# Patient Record
Sex: FEMALE | URBAN - METROPOLITAN AREA
[De-identification: names, ages, dates, MRNs, and addresses within clinical notes are randomized per-mention and may not be internally consistent; named-entity substitution may affect disease eponyms.]

---

## 2020-05-30 ENCOUNTER — NURSE TRIAGE (OUTPATIENT)
Dept: NURSING | Facility: CLINIC | Age: 50
End: 2020-05-30
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
## 2020-08-13 ENCOUNTER — APPOINTMENT (OUTPATIENT)
Dept: URBAN - METROPOLITAN AREA CLINIC 252 | Age: 50
Setting detail: DERMATOLOGY
End: 2020-08-13

## 2020-08-13 VITALS — WEIGHT: 138 LBS | HEIGHT: 62.5 IN | RESPIRATION RATE: 14 BRPM

## 2020-08-13 DIAGNOSIS — L71.0 PERIORAL DERMATITIS: ICD-10-CM

## 2020-08-13 DIAGNOSIS — L30.9 DERMATITIS, UNSPECIFIED: ICD-10-CM

## 2020-08-13 DIAGNOSIS — D22 MELANOCYTIC NEVI: ICD-10-CM

## 2020-08-13 PROBLEM — D22.71 MELANOCYTIC NEVI OF RIGHT LOWER LIMB, INCLUDING HIP: Status: ACTIVE | Noted: 2020-08-13

## 2020-08-13 PROBLEM — D22.5 MELANOCYTIC NEVI OF TRUNK: Status: ACTIVE | Noted: 2020-08-13

## 2020-08-13 PROBLEM — D22.72 MELANOCYTIC NEVI OF LEFT LOWER LIMB, INCLUDING HIP: Status: ACTIVE | Noted: 2020-08-13

## 2020-08-13 PROCEDURE — OTHER PRESCRIPTION: OTHER

## 2020-08-13 PROCEDURE — OTHER COUNSELING: OTHER

## 2020-08-13 PROCEDURE — 99203 OFFICE O/P NEW LOW 30 MIN: CPT

## 2020-08-13 RX ORDER — TRIAMCINOLONE ACETONIDE 1 MG/G
0.1% CREAM TOPICAL BID
Qty: 1 | Refills: 0 | Status: ERX | COMMUNITY
Start: 2020-08-13

## 2020-08-13 RX ORDER — MINOCYCLINE HYDROCHLORIDE 50 MG/1
50MG CAPSULE ORAL TWICE PER DAY
Qty: 60 | Refills: 1 | Status: ERX | COMMUNITY
Start: 2020-08-13

## 2020-08-13 ASSESSMENT — LOCATION DETAILED DESCRIPTION DERM
LOCATION DETAILED: MIDDLE STERNUM
LOCATION DETAILED: LEFT SUPERIOR CENTRAL MALAR CHEEK
LOCATION DETAILED: LEFT ANTERIOR DISTAL THIGH
LOCATION DETAILED: LEFT PROXIMAL POSTERIOR UPPER ARM
LOCATION DETAILED: RIGHT ANTERIOR DISTAL THIGH

## 2020-08-13 ASSESSMENT — LOCATION SIMPLE DESCRIPTION DERM
LOCATION SIMPLE: RIGHT THIGH
LOCATION SIMPLE: LEFT THIGH
LOCATION SIMPLE: CHEST
LOCATION SIMPLE: LEFT CHEEK
LOCATION SIMPLE: LEFT POSTERIOR UPPER ARM

## 2020-08-13 ASSESSMENT — LOCATION ZONE DERM
LOCATION ZONE: TRUNK
LOCATION ZONE: ARM
LOCATION ZONE: LEG
LOCATION ZONE: FACE

## 2020-08-13 NOTE — PROCEDURE: COUNSELING
Detail Level: Detailed
Patient Specific Counseling (Will Not Stick From Patient To Patient): - Recommended oral minocycline twice per day until clinically resolved then for 5 additional days.
Detail Level: Simple
Patient Specific Counseling (Will Not Stick From Patient To Patient): Discussed atopic dermatitis vs contact dermatitis. Recommended using bid as needed. Moisturize twice per day for maintenance with fragrance free moisturizer.
Detail Level: Zone

## 2020-08-13 NOTE — HPI: RASH
Is This A New Presentation, Or A Follow-Up?: Rash
Additional History: Hydrocortisone valerate helped after 2 application.
How Severe Is Your Rash?: mild
Additional History: She reports that she has seen several doctors who cannot figure this out. She reports that she has not been given an antibiotic for this problem.

## 2020-10-12 ENCOUNTER — RX ONLY (RX ONLY)
Age: 50
End: 2020-10-12

## 2020-10-12 RX ORDER — TRIAMCINOLONE ACETONIDE 1 MG/G
0.1% CREAM TOPICAL BID
Qty: 1 | Refills: 2 | Status: ERX

## 2021-09-16 ENCOUNTER — APPOINTMENT (OUTPATIENT)
Dept: URBAN - METROPOLITAN AREA CLINIC 252 | Age: 51
Setting detail: DERMATOLOGY
End: 2021-09-17

## 2021-09-16 VITALS — RESPIRATION RATE: 15 BRPM | WEIGHT: 135 LBS | HEIGHT: 63 IN

## 2021-09-16 DIAGNOSIS — L56.4 POLYMORPHOUS LIGHT ERUPTION: ICD-10-CM

## 2021-09-16 PROCEDURE — 99213 OFFICE O/P EST LOW 20 MIN: CPT | Mod: 25

## 2021-09-16 PROCEDURE — OTHER COUNSELING: OTHER

## 2021-09-16 PROCEDURE — OTHER INTRAMUSCULAR KENALOG: OTHER

## 2021-09-16 ASSESSMENT — LOCATION ZONE DERM
LOCATION ZONE: ARM
LOCATION ZONE: TRUNK

## 2021-09-16 ASSESSMENT — LOCATION DETAILED DESCRIPTION DERM
LOCATION DETAILED: RIGHT PROXIMAL POSTERIOR UPPER ARM
LOCATION DETAILED: RIGHT BUTTOCK
LOCATION DETAILED: LEFT PROXIMAL POSTERIOR UPPER ARM

## 2021-09-16 ASSESSMENT — LOCATION SIMPLE DESCRIPTION DERM
LOCATION SIMPLE: RIGHT BUTTOCK
LOCATION SIMPLE: RIGHT POSTERIOR UPPER ARM
LOCATION SIMPLE: LEFT POSTERIOR UPPER ARM

## 2021-09-16 NOTE — HPI: RASH
Is This A New Presentation, Or A Follow-Up?: Rash
Additional History: Was given triamcinolone and betamethasone and hydrocortisone valerate. This flare started 6-8 weeks ago. She she goes in sun she gets a bubbly rash after 15-20 minutes every spring break. She has some peely skin from maybe a sunburn. She reports being severely itchy. She minimally uses sunscreen. Used betamethasone bid for a few days and it did not help. She reports 8/10. Denies side effects with oral sterlids in the past.

## 2021-09-16 NOTE — PROCEDURE: COUNSELING
Patient Specific Counseling (Will Not Stick From Patient To Patient): - Discussed and recommended intramuscular injection of Kenalog.\\nDiscussed heliocare and seeking shade. Recommended benadryl at night and cetirizine qam until itch resolves. Return to clinic in 2 weeks if not resolved.
Detail Level: Simple

## 2022-07-25 ENCOUNTER — APPOINTMENT (OUTPATIENT)
Dept: URBAN - METROPOLITAN AREA CLINIC 252 | Age: 52
Setting detail: DERMATOLOGY
End: 2022-07-25

## 2022-07-25 VITALS — RESPIRATION RATE: 16 BRPM | HEIGHT: 63 IN | WEIGHT: 135 LBS

## 2022-07-25 DIAGNOSIS — D22 MELANOCYTIC NEVI: ICD-10-CM

## 2022-07-25 DIAGNOSIS — L81.4 OTHER MELANIN HYPERPIGMENTATION: ICD-10-CM

## 2022-07-25 DIAGNOSIS — L82.1 OTHER SEBORRHEIC KERATOSIS: ICD-10-CM

## 2022-07-25 DIAGNOSIS — D18.0 HEMANGIOMA: ICD-10-CM

## 2022-07-25 DIAGNOSIS — Z71.89 OTHER SPECIFIED COUNSELING: ICD-10-CM

## 2022-07-25 DIAGNOSIS — D69.2 OTHER NONTHROMBOCYTOPENIC PURPURA: ICD-10-CM

## 2022-07-25 PROBLEM — D18.01 HEMANGIOMA OF SKIN AND SUBCUTANEOUS TISSUE: Status: ACTIVE | Noted: 2022-07-25

## 2022-07-25 PROBLEM — D22.71 MELANOCYTIC NEVI OF RIGHT LOWER LIMB, INCLUDING HIP: Status: ACTIVE | Noted: 2022-07-25

## 2022-07-25 PROCEDURE — OTHER COUNSELING: OTHER

## 2022-07-25 PROCEDURE — OTHER PHOTO-DOCUMENTATION: OTHER

## 2022-07-25 PROCEDURE — OTHER ADDITIONAL NOTES: OTHER

## 2022-07-25 PROCEDURE — 99213 OFFICE O/P EST LOW 20 MIN: CPT

## 2022-07-25 ASSESSMENT — LOCATION SIMPLE DESCRIPTION DERM
LOCATION SIMPLE: LEFT PRETIBIAL REGION
LOCATION SIMPLE: UPPER BACK
LOCATION SIMPLE: RIGHT PRETIBIAL REGION
LOCATION SIMPLE: LEFT POSTERIOR UPPER ARM
LOCATION SIMPLE: RIGHT PLANTAR SURFACE
LOCATION SIMPLE: RIGHT POSTERIOR UPPER ARM
LOCATION SIMPLE: ABDOMEN

## 2022-07-25 ASSESSMENT — LOCATION DETAILED DESCRIPTION DERM
LOCATION DETAILED: RIGHT PROXIMAL LATERAL POSTERIOR UPPER ARM
LOCATION DETAILED: LEFT PROXIMAL PRETIBIAL REGION
LOCATION DETAILED: RIGHT PROXIMAL PRETIBIAL REGION
LOCATION DETAILED: SUPERIOR THORACIC SPINE
LOCATION DETAILED: PERIUMBILICAL SKIN
LOCATION DETAILED: LEFT PROXIMAL POSTERIOR UPPER ARM
LOCATION DETAILED: RIGHT INSTEP

## 2022-07-25 ASSESSMENT — LOCATION ZONE DERM
LOCATION ZONE: ARM
LOCATION ZONE: FEET
LOCATION ZONE: LEG
LOCATION ZONE: TRUNK

## 2022-07-25 NOTE — PROCEDURE: ADDITIONAL NOTES
Detail Level: Simple
Additional Notes: **** recommend appointment with opthomology because on plaquinol
Render Risk Assessment In Note?: no

## 2023-02-20 NOTE — PROCEDURE: COUNSELING
· Stable prior to this episode, diarrhea likely due to diverticulitis
Detail Level: Zone
Detail Level: Detailed
Detail Level: Generalized

## 2023-05-18 ENCOUNTER — APPOINTMENT (OUTPATIENT)
Dept: URBAN - METROPOLITAN AREA CLINIC 252 | Age: 53
Setting detail: DERMATOLOGY
End: 2023-05-22

## 2023-05-18 VITALS — HEIGHT: 62 IN | WEIGHT: 143 LBS | RESPIRATION RATE: 16 BRPM

## 2023-05-18 DIAGNOSIS — R21 RASH AND OTHER NONSPECIFIC SKIN ERUPTION: ICD-10-CM

## 2023-05-18 PROCEDURE — OTHER COUNSELING: OTHER

## 2023-05-18 PROCEDURE — OTHER PRESCRIPTION: OTHER

## 2023-05-18 PROCEDURE — OTHER PRESCRIPTION MEDICATION MANAGEMENT: OTHER

## 2023-05-18 PROCEDURE — 99213 OFFICE O/P EST LOW 20 MIN: CPT

## 2023-05-18 RX ORDER — CETIRIZINE HCL 1 MG/ML
SOLUTION, ORAL ORAL
Qty: 60 | Refills: 1 | Status: ERX | COMMUNITY
Start: 2023-05-18

## 2023-05-18 ASSESSMENT — LOCATION ZONE DERM: LOCATION ZONE: TRUNK

## 2023-05-18 ASSESSMENT — LOCATION DETAILED DESCRIPTION DERM: LOCATION DETAILED: LEFT MEDIAL SUPERIOR CHEST

## 2023-05-18 ASSESSMENT — LOCATION SIMPLE DESCRIPTION DERM: LOCATION SIMPLE: CHEST

## 2023-05-18 NOTE — HPI: RASH
What Type Of Note Output Would You Prefer (Optional)?: Bullet Format
How Severe Is Your Rash?: moderate
Is This A New Presentation, Or A Follow-Up?: Rash
Additional History: Hx of chronic covid \\n

## 2023-05-18 NOTE — PROCEDURE: PRESCRIPTION MEDICATION MANAGEMENT
Render In Strict Bullet Format?: No
Continue Regimen: Doxycycline 100 mg and Triamcinolone as prescribed by ER doctors.
Detail Level: Zone

## 2023-05-18 NOTE — PROCEDURE: COUNSELING
Detail Level: Detailed
Patient Specific Counseling (Will Not Stick From Patient To Patient): **discussed taking Zyrtec.\\n**discussed with pt if rash does not resolve she can be worked in for a bx.

## 2023-08-22 ENCOUNTER — RX ONLY (RX ONLY)
Age: 53
End: 2023-08-22

## 2023-08-22 RX ORDER — CETIRIZINE HCL 1 MG/ML
SOLUTION, ORAL ORAL
Qty: 60 | Refills: 1 | Status: ERX

## 2023-08-22 RX ORDER — CETIRIZINE HCL 1 MG/ML
SOLUTION, ORAL ORAL
Qty: 90 | Refills: 0 | Status: ERX | COMMUNITY
Start: 2023-08-22

## 2023-11-16 ENCOUNTER — RX ONLY (RX ONLY)
Age: 53
End: 2023-11-16

## 2023-11-16 RX ORDER — CETIRIZINE HCL 1 MG/ML
SOLUTION, ORAL ORAL
Qty: 60 | Refills: 0 | Status: ERX

## 2025-04-14 ENCOUNTER — APPOINTMENT (OUTPATIENT)
Dept: URBAN - METROPOLITAN AREA CLINIC 252 | Age: 55
Setting detail: DERMATOLOGY
End: 2025-04-14

## 2025-04-14 VITALS — RESPIRATION RATE: 16 BRPM | HEIGHT: 63 IN | WEIGHT: 143 LBS

## 2025-04-14 DIAGNOSIS — D49.2 NEOPLASM OF UNSPECIFIED BEHAVIOR OF BONE, SOFT TISSUE, AND SKIN: ICD-10-CM

## 2025-04-14 DIAGNOSIS — D22 MELANOCYTIC NEVI: ICD-10-CM

## 2025-04-14 DIAGNOSIS — L57.8 OTHER SKIN CHANGES DUE TO CHRONIC EXPOSURE TO NONIONIZING RADIATION: ICD-10-CM

## 2025-04-14 DIAGNOSIS — L72.0 EPIDERMAL CYST: ICD-10-CM

## 2025-04-14 DIAGNOSIS — L81.4 OTHER MELANIN HYPERPIGMENTATION: ICD-10-CM

## 2025-04-14 DIAGNOSIS — Z71.89 OTHER SPECIFIED COUNSELING: ICD-10-CM

## 2025-04-14 DIAGNOSIS — L85.3 XEROSIS CUTIS: ICD-10-CM

## 2025-04-14 PROBLEM — D22.71 MELANOCYTIC NEVI OF RIGHT LOWER LIMB, INCLUDING HIP: Status: ACTIVE | Noted: 2025-04-14

## 2025-04-14 PROCEDURE — 99213 OFFICE O/P EST LOW 20 MIN: CPT | Mod: 25

## 2025-04-14 PROCEDURE — OTHER BIOPSY BY SHAVE METHOD: OTHER

## 2025-04-14 PROCEDURE — 11102 TANGNTL BX SKIN SINGLE LES: CPT

## 2025-04-14 PROCEDURE — OTHER COUNSELING: OTHER

## 2025-04-14 ASSESSMENT — LOCATION SIMPLE DESCRIPTION DERM
LOCATION SIMPLE: RIGHT NOSE
LOCATION SIMPLE: RIGHT PLANTAR SURFACE
LOCATION SIMPLE: RIGHT FOREHEAD
LOCATION SIMPLE: RIGHT CHEEK
LOCATION SIMPLE: LEFT SHOULDER
LOCATION SIMPLE: RIGHT INDEX FINGER
LOCATION SIMPLE: LEFT INDEX FINGER
LOCATION SIMPLE: NOSE
LOCATION SIMPLE: RIGHT UPPER BACK

## 2025-04-14 ASSESSMENT — LOCATION DETAILED DESCRIPTION DERM
LOCATION DETAILED: RIGHT SUPERIOR UPPER BACK
LOCATION DETAILED: RIGHT PLANTAR FOREFOOT OVERLYING 3RD METATARSAL
LOCATION DETAILED: RIGHT INFERIOR LATERAL FOREHEAD
LOCATION DETAILED: LEFT POSTERIOR SHOULDER
LOCATION DETAILED: NASAL TIP
LOCATION DETAILED: RIGHT CENTRAL MALAR CHEEK
LOCATION DETAILED: RIGHT NASAL ALA
LOCATION DETAILED: RIGHT DISTAL DORSAL INDEX FINGER
LOCATION DETAILED: LEFT MID DORSAL INDEX FINGER

## 2025-04-14 ASSESSMENT — LOCATION ZONE DERM
LOCATION ZONE: TRUNK
LOCATION ZONE: ARM
LOCATION ZONE: NOSE
LOCATION ZONE: FACE
LOCATION ZONE: FEET
LOCATION ZONE: FINGER
LOCATION ZONE: NOSE

## 2025-04-14 NOTE — PROCEDURE: COUNSELING
Detail Level: Generalized
Detail Level: Simple
Detail Level: Detailed
Patient Specific Counseling (Will Not Stick From Patient To Patient): **discussed pricing and removal. Pt will schedule a separate appointment.
Patient Specific Counseling (Will Not Stick From Patient To Patient): **recommended 40% urea cream
Detail Level: Zone

## 2025-04-14 NOTE — PROCEDURE: BIOPSY BY SHAVE METHOD
Dressing: bandage
Was A Bandage Applied: Yes
Validate Triangulation: No
Curettage Text: The wound bed was treated with curettage after the biopsy was performed.
Size Of Lesion In Cm: 0
Anesthesia Volume In Cc: 0.3
Consent: - Consent was obtained and risks were reviewed prior to procedure today. All questions were answered prior to procedure today.\\n- Risks discussed include but are not limited to scarring, infection, bleeding, scabbing, incomplete removal, nerve damage, pain, and allergy to anesthesia.
Cryotherapy Text: The wound bed was treated with cryotherapy after the biopsy was performed.
Biopsy Type: H and E
Lab: -[id number]
Detail Level: Detailed
Information: Selecting Yes will display possible errors in your note based on the variables you have selected. This validation is only offered as a suggestion for you. PLEASE NOTE THAT THE VALIDATION TEXT WILL BE REMOVED WHEN YOU FINALIZE YOUR NOTE. IF YOU WANT TO FAX A PRELIMINARY NOTE YOU WILL NEED TO TOGGLE THIS TO 'NO' IF YOU DO NOT WANT IT IN YOUR FAXED NOTE.
Electrodesiccation Text: The wound bed was treated with electrodesiccation after the biopsy was performed.
Hemostasis: Drysol
Billing Type: Third-Party Bill
Biopsy Method: Dermablade
Anesthesia Type: 1% lidocaine with epinephrine
Electrodesiccation And Curettage Text: The wound bed was treated with electrodesiccation and curettage after the biopsy was performed.
Type Of Destruction Used: Curettage
Depth Of Biopsy: dermis
Wound Care: Aquaphor
Post-Care Instructions: WOUND CARE:\\nDo NOT submerge wound in a bath, swimming pool, or hot tub for at least 1 week or for as long as there is an open wound. Gently cleanse the site daily with mild soap and water. Be careful NOT to allow a forceful stream of water to hit the biopsy site. After cleaning/showering, apply a thin layer of petrolatum ointment or Aquaphor in the wound followed by an adhesive bandage. Continue this process daily until healed. \\n\\nBLEEDING:\\nIf you develop persistent bleeding, apply firm and steady pressure over the dressing with gauze for 15 minutes. If bleeding persists, reapply pressure with an ice pack over the gauze for 15 minutes. NEVER APPLY ICE DIRECTLY TO THE SKIN. Do NOT peek under the gauze during these 15 minutes of pressure.  Call our office at [phone] if you cannot get the bleeding to stop. \\n\\nINFECTION:\\nSigns of infection may include increasing rather than decreasing pain (after the first few days), increasing redness/swelling/heat, draining pus, pink/red streaks around the wound, and fever or chills.  Please call our office immediately at [phone] if infection is suspected. It is normal to have some mild redness on or around the wound edges; this will lighten day by day and will become less tender.\\n\\nPAIN:\\nPain is usually minimal, but if needed you may take acetaminophen (Tylenol) every four hours as needed. Applying an ice pack over the dressing for 15-20 minutes every 2-3 hours will relieve swelling, lessen pain, and help minimize bruising. NEVER APPLY ICE DIRECTLY TO THE SKIN. Avoid ibuprofen (Advil, Motrin) and naproxen (Aleve) for the first 48 hours as these can increase bleeding.\\n\\nSWELLING AND BRUISING:\\nSwelling and bruising are common and temporary, usually lasting 1 - 2 weeks. It is more common in areas treated around the eyes, hands, and feet. In the days following the procedure, swelling and bruising can be minimized by keeping the affected areas elevated when possible, reducing salty foods, and applying ice packs over the dressing for 15-20 minutes every 2-3 hours. NEVER APPLY ICE DIRECTLY TO THE SKIN.\\n\\nITCHING:\\nItchiness on and around the wound is very common and can last several days to weeks after surgery. Mild itch is normal as the wound is healing. \\n\\nNERVE CHANGES:\\nNumbness is usually temporary, but it may last for several weeks to months. You may also experience sharp pains at the wound sight as it heals.  Mild pain is normal and will gradually improve with time.\\n \\nNO SMOKING:\\nSmoking will delay the healing process. If you smoke, we recommend trying to quit or at minimum reduce how much you smoke following a procedure.
Silver Nitrate Text: The wound bed was treated with silver nitrate after the biopsy was performed.
Notification Instructions: - It can take up to 2 -3 weeks to get a biopsy result. \\n- Please refrain from calling to request results until after 2 weeks.

## 2025-05-29 ENCOUNTER — HOSPITAL ENCOUNTER (EMERGENCY)
Facility: CLINIC | Age: 55
Discharge: HOME | End: 2025-05-29
Attending: EMERGENCY MEDICINE
Payer: COMMERCIAL

## 2025-05-29 VITALS
SYSTOLIC BLOOD PRESSURE: 146 MMHG | RESPIRATION RATE: 16 BRPM | WEIGHT: 140 LBS | DIASTOLIC BLOOD PRESSURE: 74 MMHG | HEART RATE: 97 BPM | TEMPERATURE: 98.5 F | OXYGEN SATURATION: 100 %

## 2025-05-29 DIAGNOSIS — E16.2 HYPOGLYCEMIA: ICD-10-CM

## 2025-05-29 DIAGNOSIS — R42 LIGHTHEADEDNESS: ICD-10-CM

## 2025-05-29 DIAGNOSIS — R53.1 GENERALIZED WEAKNESS: ICD-10-CM

## 2025-05-29 LAB
ANION GAP SERPL CALCULATED.3IONS-SCNC: 9 MMOL/L (ref 7–15)
ATRIAL RATE - MUSE: 67 BPM
BASOPHILS # BLD AUTO: 0 10E3/UL (ref 0–0.2)
BASOPHILS NFR BLD AUTO: 0 %
BUN SERPL-MCNC: 18.2 MG/DL (ref 6–20)
CALCIUM SERPL-MCNC: 9.8 MG/DL (ref 8.8–10.4)
CHLORIDE SERPL-SCNC: 101 MMOL/L (ref 98–107)
CREAT SERPL-MCNC: 0.92 MG/DL (ref 0.51–0.95)
DIASTOLIC BLOOD PRESSURE - MUSE: NORMAL MMHG
EGFRCR SERPLBLD CKD-EPI 2021: 74 ML/MIN/1.73M2
EOSINOPHIL # BLD AUTO: 0.1 10E3/UL (ref 0–0.7)
EOSINOPHIL NFR BLD AUTO: 1 %
ERYTHROCYTE [DISTWIDTH] IN BLOOD BY AUTOMATED COUNT: 13.2 % (ref 10–15)
GLUCOSE BLDC GLUCOMTR-MCNC: 117 MG/DL (ref 70–99)
GLUCOSE BLDC GLUCOMTR-MCNC: 124 MG/DL (ref 70–99)
GLUCOSE BLDC GLUCOMTR-MCNC: 137 MG/DL (ref 70–99)
GLUCOSE BLDC GLUCOMTR-MCNC: 146 MG/DL (ref 70–99)
GLUCOSE BLDC GLUCOMTR-MCNC: 40 MG/DL (ref 70–99)
GLUCOSE BLDC GLUCOMTR-MCNC: 55 MG/DL (ref 70–99)
GLUCOSE BLDC GLUCOMTR-MCNC: 63 MG/DL (ref 70–99)
GLUCOSE BLDC GLUCOMTR-MCNC: 72 MG/DL (ref 70–99)
GLUCOSE BLDC GLUCOMTR-MCNC: 76 MG/DL (ref 70–99)
GLUCOSE BLDC GLUCOMTR-MCNC: 79 MG/DL (ref 70–99)
GLUCOSE BLDC GLUCOMTR-MCNC: 97 MG/DL (ref 70–99)
GLUCOSE BLDC GLUCOMTR-MCNC: 98 MG/DL (ref 70–99)
GLUCOSE SERPL-MCNC: 74 MG/DL (ref 70–99)
HCO3 SERPL-SCNC: 28 MMOL/L (ref 22–29)
HCT VFR BLD AUTO: 44.3 % (ref 35–47)
HGB BLD-MCNC: 14.3 G/DL (ref 11.7–15.7)
IMM GRANULOCYTES # BLD: 0 10E3/UL
IMM GRANULOCYTES NFR BLD: 0 %
INTERPRETATION ECG - MUSE: NORMAL
LYMPHOCYTES # BLD AUTO: 1.3 10E3/UL (ref 0.8–5.3)
LYMPHOCYTES NFR BLD AUTO: 15 %
MCH RBC QN AUTO: 31.3 PG (ref 26.5–33)
MCHC RBC AUTO-ENTMCNC: 32.3 G/DL (ref 31.5–36.5)
MCV RBC AUTO: 97 FL (ref 78–100)
MONOCYTES # BLD AUTO: 0.5 10E3/UL (ref 0–1.3)
MONOCYTES NFR BLD AUTO: 6 %
NEUTROPHILS # BLD AUTO: 7.2 10E3/UL (ref 1.6–8.3)
NEUTROPHILS NFR BLD AUTO: 78 %
NRBC # BLD AUTO: 0 10E3/UL
NRBC BLD AUTO-RTO: 0 /100
P AXIS - MUSE: 54 DEGREES
PLATELET # BLD AUTO: 265 10E3/UL (ref 150–450)
POTASSIUM SERPL-SCNC: 4.5 MMOL/L (ref 3.4–5.3)
PR INTERVAL - MUSE: 218 MS
QRS DURATION - MUSE: 82 MS
QT - MUSE: 390 MS
QTC - MUSE: 412 MS
R AXIS - MUSE: 18 DEGREES
RBC # BLD AUTO: 4.57 10E6/UL (ref 3.8–5.2)
SODIUM SERPL-SCNC: 138 MMOL/L (ref 135–145)
SYSTOLIC BLOOD PRESSURE - MUSE: NORMAL MMHG
T AXIS - MUSE: 19 DEGREES
TROPONIN T SERPL HS-MCNC: <6 NG/L
VENTRICULAR RATE- MUSE: 67 BPM
WBC # BLD AUTO: 9.2 10E3/UL (ref 4–11)

## 2025-05-29 PROCEDURE — 36415 COLL VENOUS BLD VENIPUNCTURE: CPT | Performed by: EMERGENCY MEDICINE

## 2025-05-29 PROCEDURE — 250N000013 HC RX MED GY IP 250 OP 250 PS 637: Performed by: EMERGENCY MEDICINE

## 2025-05-29 PROCEDURE — 99284 EMERGENCY DEPT VISIT MOD MDM: CPT | Mod: 25

## 2025-05-29 PROCEDURE — 85025 COMPLETE CBC W/AUTO DIFF WBC: CPT | Performed by: EMERGENCY MEDICINE

## 2025-05-29 PROCEDURE — 96360 HYDRATION IV INFUSION INIT: CPT

## 2025-05-29 PROCEDURE — 84484 ASSAY OF TROPONIN QUANT: CPT | Performed by: EMERGENCY MEDICINE

## 2025-05-29 PROCEDURE — 82310 ASSAY OF CALCIUM: CPT | Performed by: EMERGENCY MEDICINE

## 2025-05-29 PROCEDURE — 96361 HYDRATE IV INFUSION ADD-ON: CPT

## 2025-05-29 PROCEDURE — 82962 GLUCOSE BLOOD TEST: CPT

## 2025-05-29 PROCEDURE — 258N000001 HC RX 258: Performed by: EMERGENCY MEDICINE

## 2025-05-29 PROCEDURE — 258N000003 HC RX IP 258 OP 636: Performed by: EMERGENCY MEDICINE

## 2025-05-29 PROCEDURE — 93005 ELECTROCARDIOGRAM TRACING: CPT

## 2025-05-29 RX ORDER — ONDANSETRON 4 MG/1
4 TABLET, ORALLY DISINTEGRATING ORAL EVERY 8 HOURS PRN
COMMUNITY

## 2025-05-29 RX ORDER — OXYCODONE HYDROCHLORIDE 5 MG/1
5 TABLET ORAL EVERY 6 HOURS PRN
COMMUNITY

## 2025-05-29 RX ORDER — DICYCLOMINE HCL 20 MG
20 TABLET ORAL 4 TIMES DAILY PRN
COMMUNITY

## 2025-05-29 RX ORDER — MAGNESIUM OXIDE 400 MG/1
800 TABLET ORAL AT BEDTIME
COMMUNITY

## 2025-05-29 RX ORDER — FLUTICASONE PROPIONATE 50 MCG
1 SPRAY, SUSPENSION (ML) NASAL DAILY PRN
COMMUNITY

## 2025-05-29 RX ORDER — FLECAINIDE ACETATE 100 MG/1
100 TABLET ORAL 2 TIMES DAILY
COMMUNITY

## 2025-05-29 RX ORDER — ALPRAZOLAM 0.5 MG
0.5 TABLET ORAL 3 TIMES DAILY PRN
COMMUNITY

## 2025-05-29 RX ORDER — DEXTROSE MONOHYDRATE AND SODIUM CHLORIDE 5; .45 G/100ML; G/100ML
INJECTION, SOLUTION INTRAVENOUS CONTINUOUS
Status: DISCONTINUED | OUTPATIENT
Start: 2025-05-29 | End: 2025-05-29

## 2025-05-29 RX ORDER — OMEPRAZOLE 20 MG/1
20 CAPSULE, DELAYED RELEASE ORAL DAILY
COMMUNITY
End: 2025-05-29

## 2025-05-29 RX ORDER — CLONAZEPAM 1 MG/1
1 TABLET ORAL AT BEDTIME
COMMUNITY

## 2025-05-29 RX ORDER — ZOLMITRIPTAN 5 MG/1
5 TABLET, ORALLY DISINTEGRATING ORAL
COMMUNITY

## 2025-05-29 RX ORDER — DEXTROSE MONOHYDRATE AND SODIUM CHLORIDE 5; .45 G/100ML; G/100ML
INJECTION, SOLUTION INTRAVENOUS ONCE
Status: COMPLETED | OUTPATIENT
Start: 2025-05-29 | End: 2025-05-29

## 2025-05-29 RX ORDER — POLYETHYLENE GLYCOL 3350 17 G/17G
1-2 POWDER, FOR SOLUTION ORAL DAILY
COMMUNITY

## 2025-05-29 RX ORDER — ESOMEPRAZOLE MAGNESIUM 40 MG/1
40 CAPSULE, DELAYED RELEASE ORAL
COMMUNITY

## 2025-05-29 RX ORDER — LEVOTHYROXINE SODIUM 125 UG/1
125 TABLET ORAL
COMMUNITY

## 2025-05-29 RX ORDER — VITAMIN B COMPLEX
1 TABLET ORAL DAILY
COMMUNITY

## 2025-05-29 RX ORDER — DEXTROSE MONOHYDRATE 100 MG/ML
INJECTION, SOLUTION INTRAVENOUS ONCE
Status: DISCONTINUED | OUTPATIENT
Start: 2025-05-29 | End: 2025-05-29

## 2025-05-29 RX ORDER — CETIRIZINE HYDROCHLORIDE 10 MG/1
10 TABLET ORAL DAILY
COMMUNITY

## 2025-05-29 RX ORDER — ESTRADIOL 0.1 MG/G
CREAM VAGINAL
COMMUNITY

## 2025-05-29 RX ORDER — KETOROLAC TROMETHAMINE 30 MG/ML
30 INJECTION, SOLUTION INTRAMUSCULAR; INTRAVENOUS
COMMUNITY

## 2025-05-29 RX ORDER — HYDROXYZINE HYDROCHLORIDE 25 MG/1
25 TABLET, FILM COATED ORAL 3 TIMES DAILY PRN
COMMUNITY

## 2025-05-29 RX ORDER — SODIUM FLUORIDE 5 MG/G
GEL, DENTIFRICE DENTAL AT BEDTIME
COMMUNITY

## 2025-05-29 RX ORDER — ALBUTEROL SULFATE 90 UG/1
2 INHALANT RESPIRATORY (INHALATION) EVERY 6 HOURS PRN
COMMUNITY

## 2025-05-29 RX ORDER — GALCANEZUMAB 120 MG/ML
120 INJECTION, SOLUTION SUBCUTANEOUS
COMMUNITY

## 2025-05-29 RX ORDER — DEXTROSE MONOHYDRATE 25 G/50ML
25-50 INJECTION, SOLUTION INTRAVENOUS
Status: DISCONTINUED | OUTPATIENT
Start: 2025-05-29 | End: 2025-05-29 | Stop reason: HOSPADM

## 2025-05-29 RX ORDER — ESTRADIOL 0.04 MG/D
1 PATCH, EXTENDED RELEASE TRANSDERMAL
COMMUNITY
End: 2025-05-29

## 2025-05-29 RX ORDER — POLYETHYLENE GLYCOL 3350 17 G/17G
238 POWDER, FOR SOLUTION ORAL
COMMUNITY

## 2025-05-29 RX ORDER — NICOTINE POLACRILEX 4 MG
15-30 LOZENGE BUCCAL
Status: DISCONTINUED | OUTPATIENT
Start: 2025-05-29 | End: 2025-05-29 | Stop reason: HOSPADM

## 2025-05-29 RX ORDER — FAMOTIDINE 20 MG/1
40 TABLET, FILM COATED ORAL AT BEDTIME
COMMUNITY

## 2025-05-29 RX ADMIN — DEXTROSE AND SODIUM CHLORIDE: 5; .45 INJECTION, SOLUTION INTRAVENOUS at 14:02

## 2025-05-29 RX ADMIN — DEXTROSE: 10 SOLUTION INTRAVENOUS at 16:34

## 2025-05-29 RX ADMIN — DEXTROSE 30 G: 15 GEL ORAL at 14:39

## 2025-05-29 RX ADMIN — SODIUM CHLORIDE 1000 ML: 0.9 INJECTION, SOLUTION INTRAVENOUS at 18:03

## 2025-05-29 RX ADMIN — SODIUM CHLORIDE 1000 ML: 0.9 INJECTION, SOLUTION INTRAVENOUS at 14:02

## 2025-05-29 RX ADMIN — DEXTROSE 30 G: 15 GEL ORAL at 15:49

## 2025-05-29 ASSESSMENT — ACTIVITIES OF DAILY LIVING (ADL)
ADLS_ACUITY_SCORE: 41

## 2025-05-29 ASSESSMENT — COLUMBIA-SUICIDE SEVERITY RATING SCALE - C-SSRS
2. HAVE YOU ACTUALLY HAD ANY THOUGHTS OF KILLING YOURSELF IN THE PAST MONTH?: NO
1. IN THE PAST MONTH, HAVE YOU WISHED YOU WERE DEAD OR WISHED YOU COULD GO TO SLEEP AND NOT WAKE UP?: NO
6. HAVE YOU EVER DONE ANYTHING, STARTED TO DO ANYTHING, OR PREPARED TO DO ANYTHING TO END YOUR LIFE?: NO

## 2025-05-29 NOTE — ED TRIAGE NOTES
"Pt to ED via EMS  d/t becoming \"unresponsive\" at the clinic she was getting a procedure done at. Pt received propofol and ketamine. Pt BG was noted to be low by EMS and clinic staff, so D10 was started prior to arrival. Pt is alert but appears fatigued on arrival to ED.        "

## 2025-05-29 NOTE — ED NOTES
Pt glucose read as 40. Pt is now more alert compared to arrival, states she feels better, and able to ambulate to toilet. BG retested with different monitor and on different hand w/ result of BG 72.

## 2025-05-29 NOTE — ED PROVIDER NOTES
Emergency Department Note      History of Present Illness     Chief Complaint   Loss of Consciousness    HPI   Piper Cowart is a 55 year old female with history of hypoglycemia and cervical spinal stenosis who presents to the ED via EMS with her  for evaluation of loss of consciousness. April had a bilateral cervical spine surgical procedure today, she had low blood sugar and received a 250 D5 bag before, during, and after. EMS reports a blood sugar of 90 and upon arrival in the ED her blood sugar was 79. She reports chest pain and shortness of breath, the chest pain is similar to the pain she takes Nexium for. She denies dysuria, runny nose, fever, or cough. April has the procedure every 3-4 months, she does not normally have a reaction like this. Her  notes that she has had episodes where she comes in and out of consciousness but today these episodes have persisted far longer than any episode before. 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 April does not smoke or drink alcohol.     Independent Historian    and EMS as detailed above.    Review of External Notes   I reviewed the ED from 05/23/2025, the patient was seen for a migraine.    Past Medical History     Medical History and Problem List   Atypical lobular hyperplasia of right breast   ADHD   Anxiety   Back ache   Benign familial tremor   Celiac disease   Carpal tunnel syndrome, right   Concussion   Cervical spinal stenosis   Chronic migraine   Degenerative disc disease   Environmental allergies   Episodic mood disorder   Encephalopathy   Endometriosis   Fatigue   GERD   Gallstones   Galactorrhea   Hirsutism   Herniation of lumbar intervertebral disc, L4  Hypoglycemia   Hypothyroidism   Irritable bowel syndrome   Lumbar radiculopathy  Myofacial pain   Motion sickness   Occipital neuralgia   Patellofemoral pain syndrome   Polymorphic light eruption   Post concussion syndrome   Recurrent sinus infection   Ruptured ovarian  cyst   Sjogren syndrome with dental involvement  Scoliosis  Traumatic Impingement syndrome of right shoulder  Vitamin D deficiency   Vaginal cuff dehiscence     Medications   Albuterol   Atarax   Bentyl   Cymbalta   Emgality   Glucose   Klonopin   Levoxyl   Linzess   Metoprolol   Mysoline   Nexium   Oxycodone   Pepcid   Reyvow   Toradol   Tambocor   Ubrelvy   Xanax   Zofran   Zomig     Surgical History   Abdominal procedure of peritoneum and omentum   Anterior fusion cervical spine   Breast biopsy   Cytoscopy, repair vaginal dehiscence   Colonoscopy   Hemorrhoidectomy   Hysterectomy   Pelvic laparoscopy   Salpingo-oophorectomy     Physical Exam     Patient Vitals for the past 24 hrs:   BP Temp Temp src Pulse Resp SpO2 Weight   05/29/25 1931 -- -- -- -- -- 99 % --   05/29/25 1930 (!) 146/74 -- -- 97 -- 98 % --   05/29/25 1900 121/82 -- -- 80 16 98 % --   05/29/25 1800 130/86 -- -- 79 16 99 % --   05/29/25 1730 131/86 -- -- 89 16 98 % --   05/29/25 1630 124/89 -- -- 83 18 96 % --   05/29/25 1530 (!) 143/86 -- -- 79 -- 100 % --   05/29/25 1521 -- 98.5  F (36.9  C) Oral -- -- -- --   05/29/25 1503 -- -- -- -- -- 100 % --   05/29/25 1502 (!) 157/94 -- -- 73 -- -- --   05/29/25 1336 (!) 168/92 97  F (36.1  C) Temporal 74 16 98 % 63.5 kg (140 lb)     Physical Exam  Constitutional: Well developed, nontox appearance  Head: Atraumatic.   Mouth/Throat: Oropharynx is clear and moist.   Neck:  no stridor  Eyes: no scleral icterus  Cardiovascular: RRR, 2+ bilat radial pulses  Pulmonary/Chest: nml resp effort, Clear BS bilat  Abdominal: ND, soft, NT, no rebound or guarding   Ext: Warm, well perfused, no edema  Neurological: A&O, symmetric facies, moves ext x4, sensation intact throughout  Skin: Skin is warm and dry.   Psychiatric: Behavior is normal. Thought content normal.   Nursing note and vitals reviewed.    Diagnostics     Lab Results   Labs Ordered and Resulted from Time of ED Arrival to Time of ED Departure   GLUCOSE BY  METER - Abnormal       Result Value    GLUCOSE BY METER POCT 55 (*)    GLUCOSE BY METER - Abnormal    GLUCOSE BY METER POCT 40 (*)    GLUCOSE BY METER - Abnormal    GLUCOSE BY METER POCT 63 (*)    GLUCOSE BY METER - Abnormal    GLUCOSE BY METER POCT 146 (*)    GLUCOSE BY METER - Abnormal    GLUCOSE BY METER POCT 124 (*)    GLUCOSE BY METER - Abnormal    GLUCOSE BY METER POCT 117 (*)    GLUCOSE BY METER - Abnormal    GLUCOSE BY METER POCT 137 (*)    BASIC METABOLIC PANEL - Normal    Sodium 138      Potassium 4.5      Chloride 101      Carbon Dioxide (CO2) 28      Anion Gap 9      Urea Nitrogen 18.2      Creatinine 0.92      GFR Estimate 74      Calcium 9.8      Glucose 74     TROPONIN T, HIGH SENSITIVITY - Normal    Troponin T, High Sensitivity <6     GLUCOSE BY METER - Normal    GLUCOSE BY METER POCT 79     GLUCOSE BY METER - Normal    GLUCOSE BY METER POCT 72     GLUCOSE BY METER - Normal    GLUCOSE BY METER POCT 97     GLUCOSE BY METER - Normal    GLUCOSE BY METER POCT 76     GLUCOSE BY METER - Normal    GLUCOSE BY METER POCT 98     CBC WITH PLATELETS AND DIFFERENTIAL    WBC Count 9.2      RBC Count 4.57      Hemoglobin 14.3      Hematocrit 44.3      MCV 97      MCH 31.3      MCHC 32.3      RDW 13.2      Platelet Count 265      % Neutrophils 78      % Lymphocytes 15      % Monocytes 6      % Eosinophils 1      % Basophils 0      % Immature Granulocytes 0      NRBCs per 100 WBC 0      Absolute Neutrophils 7.2      Absolute Lymphocytes 1.3      Absolute Monocytes 0.5      Absolute Eosinophils 0.1      Absolute Basophils 0.0      Absolute Immature Granulocytes 0.0      Absolute NRBCs 0.0     GLUCOSE MONITOR NURSING POCT     Imaging   No orders to display     EKG   ECG taken at 1410, ECG read at 1414  Sinus rhythm with 1st degree AV block    Rate 67 bpm. IL interval 218 ms. QRS duration 82 ms. QT/QTc 390/412 ms. P-R-T axes 54 18 19.    Independent Interpretation   EKG as noted above.    ED Course      Medications  Administered   Medications   glucose gel 15-30 g ( Oral See Alternative 5/29/25 1808)     Or   dextrose 50 % injection 25-50 mL ( Intravenous Not Given 5/29/25 1808)     Or   glucagon injection 1 mg ( Subcutaneous See Alternative 5/29/25 1808)   sodium chloride 0.9% BOLUS 1,000 mL (0 mLs Intravenous Stopped 5/29/25 1632)   dextrose 5% and 0.45% NaCl infusion (0 mLs Intravenous Stopped 5/29/25 1633)   sodium chloride 0.9% BOLUS 1,000 mL (0 mLs Intravenous Stopped 5/29/25 1857)     Procedures   Procedures     Discussion of Management   Admitting Hospitalist, Dr. Leahy.     ED Course   ED Course as of 05/29/25 2013   Thu May 29, 2025   1326 I obtained history and examined the patient as noted above.    1554 I rechecked and updated the patient.    1633 I spoke to hospitalist Dr. Leahy regarding the patient's symptoms, hypoglycemia, and loss of consciousness.   1750 I rechecked and updated the patient.      Additional Documentation  None    Medical Decision Making / Diagnosis     CMS Diagnoses: None    MIPS   None       MDM   April Sofya is a 55 year old female presenting with hypoglycemia, weakness    Differential diagnosis includes acute on chronic hypoglycemia, electrolyte abnormality, dehydration, generalized anxiety disorder, syncope.  Doubt seizure or status epilepticus given history and physical exam.  Since patient has had previous episodes of transient unresponsiveness in the past.  Her  is not sure if there was a pattern related to anxiety provoking situations or illnesses.  Patient reports that she has had issues with hypoglycemia in the past and diagnosed stress-induced hypoglycemia by endocrinology.  Patient is mildly hypertensive otherwise her vital signs are reassuring.  EKG interpretation as noted above.  Labs significant for recurrent hypoglycemia.  Upon recheck, attempted ambulation with increased lightheadedness.  Discussed admission with the hospitalist given fluctuating blood glucose  levels and persistent symptoms.  He recommended monitoring the emergency department, stop glucose infusions.  Upon recheck, the patient remains symptomatic although blood glucose levels seem to have stabilized.  IV fluids given the patient was monitored further  and later able to ambulate.  She reports feeling improved.  Patient is to be discrepancy with  the patient's continuous glucose monitor and glucometer readings in the ED.  She is advised to rely on her fingerstick glucometers at home.  Overall she feels comfortable discharging with follow-up with endocrinology which I think is reasonable.  At this time I feel the pt is safe for discharge.  Recommendations given regarding follow up with PCP and return to the emergency department as needed for new or worsening symptoms.  Pt and  counseled on all results, disposition and diagnosis.  They are understanding and agreeable to plan. Patient discharged in stable condition.       Disposition   The patient was discharged.     Diagnosis     ICD-10-CM    1. Hypoglycemia  E16.2       2. Lightheadedness  R42       3. Generalized weakness  R53.1            I, Linda Doty, am serving as a scribe at 1:24 PM on 5/29/2025 to document services personally performed by Jitendra Cardoso MD based on my observations and the provider's statements to me.        Jitendra Cardoso MD  05/29/25 2014

## 2025-05-29 NOTE — ED NOTES
Pt was able to ambulate on her own out of bed to a sitting position. Pt reported being lightheaded and dizzy initially, but reported feeling better after sitting up for a while. Pt was able to stand on her own and was able to take a few steps. Pt reported feeling dizziness again, and was lowered slowly to the ground by writer and pt's  and placed on her side. Pt reported a sudden onset of visual impairment which recovered as she was placed on the ground. Pt was then assisted by 4x staff back onto the bed.

## 2025-05-29 NOTE — ED NOTES
Bed: ED05  Expected date:   Expected time:   Means of arrival:   Comments:  Beverly 3 55 F decreased responsiveness after procedure at pain [facility] hypoglycemic now corrected eta 7973

## 2025-05-30 NOTE — PHARMACY-ADMISSION MEDICATION HISTORY
Pharmacist Admission Medication History    Admission medication history is complete. The information provided in this note is only as accurate as the sources available at the time of the update.    Information Source(s): Patient via in-person    Pertinent Information: Has migraine abortive medications with her. Needs Levoxyl brand name only for levothyroxine.    Changes made to PTA medication list:  Added: all  Deleted: None  Changed: None    Allergies reviewed with patient and updates made in EHR: no    Medication History Completed By: Briseyda Frank RPH 5/29/2025 7:03 PM    PTA Med List   Medication Sig Note Last Dose/Taking    albuterol (PROAIR HFA/PROVENTIL HFA/VENTOLIN HFA) 108 (90 Base) MCG/ACT inhaler Inhale 2 puffs into the lungs every 6 hours as needed for shortness of breath, wheezing or cough.  Taking As Needed    ALPRAZolam (XANAX) 0.5 MG tablet Take 0.5 mg by mouth 3 times daily as needed for anxiety.  Taking As Needed    cetirizine (ZYRTEC) 10 MG tablet Take 10 mg by mouth daily.  5/29/2025 Morning    clonazePAM (KLONOPIN) 1 MG tablet Take 1 mg by mouth at bedtime. tremors  5/28/2025 Evening    dicyclomine (BENTYL) 20 MG tablet Take 20 mg by mouth 4 times daily as needed.  Taking As Needed    esomeprazole (NEXIUM) 40 MG DR capsule Take 40 mg by mouth every morning (before breakfast). Take 30-60 minutes before eating.  5/29/2025 Morning    estradiol (ESTRACE) 0.1 MG/GM vaginal cream Place vaginally twice a week. Mon/Fri  Past Week    famotidine (PEPCID) 20 MG tablet Take 40 mg by mouth at bedtime.  5/28/2025 Bedtime    flecainide (TAMBOCOR) 100 MG tablet Take 100 mg by mouth 2 times daily. 6am/6pm  5/29/2025 Morning    fluticasone (FLONASE) 50 MCG/ACT nasal spray Spray 1 spray into both nostrils daily as needed for allergies or rhinitis.  5/29/2025    galcanezumab-gnlm (EMGALITY) 120 MG/ML injection Inject 120 mg subcutaneously every 28 days. 5/29/2025: Due in 10 days Past Month    glucose-vitamin C 4-6  GM-MG CHEW Take 1 tablet by mouth every hour as needed for low blood sugar.  Taking As Needed    hydrOXYzine HCl (ATARAX) 25 MG tablet Take 25 mg by mouth 3 times daily as needed for itching.  Taking As Needed    ketorolac (TORADOL) 30 MG/ML injection Inject 30 mg into the vein once as needed (migraine).  Taking As Needed    Lasmiditan Succinate 50 MG TABS Take 50 mg by mouth as needed (migraine).  Taking As Needed    levothyroxine (SYNTHROID/LEVOTHROID) 125 MCG tablet Take 125 mcg by mouth every morning (before breakfast).  5/29/2025 Morning    linaclotide (LINZESS) 290 MCG capsule Take 290 mcg by mouth every morning (before breakfast).  5/29/2025 Morning    magnesium oxide 400 MG tablet Take 800 mg by mouth at bedtime.  5/28/2025 Evening    ondansetron (ZOFRAN ODT) 4 MG ODT tab Take 4 mg by mouth every 8 hours as needed for nausea.  Taking As Needed    oxyCODONE (ROXICODONE) 5 MG tablet Take 5 mg by mouth every 6 hours as needed for severe pain.  Taking As Needed    polyethylene glycol (MIRALAX) 17 GM/Dose powder Take 1-2 Capfuls by mouth daily.  5/28/2025 Morning    polyethylene glycol (MIRALAX) 17 GM/Dose powder Take 238 g by mouth every 30 days.  Past Month    sodium fluoride dental gel (PREVIDENT) 1.1 % GEL topical gel Apply to affected area at bedtime.  5/28/2025    tiZANidine (ZANAFLEX) 4 MG tablet Take 4 mg by mouth at bedtime.  5/28/2025 Evening    ubrogepant (UBRELVY) 100 MG tablet Take 100 mg by mouth at onset of headache.  Taking As Needed    Vitamin D3 (VITAMIN D, CHOLECALCIFEROL,) 25 mcg (1000 units) tablet Take 1 tablet by mouth daily.  5/29/2025 Morning    ZOLMitriptan (ZOMIG-ZMT) 5 MG ODT Take 5 mg by mouth at onset of headache for migraine.  Taking As Needed

## 2025-05-30 NOTE — DISCHARGE INSTRUCTIONS
Please make sure to eat and drink at home.  Please push plenty of fluids and follow-up with your primary care provider for reevaluation.    Please return to the emergency department as needed for new or worsening symptoms including fainting, vomiting unable to keep anything down, new or worsening chest pain or shortness of breath, any other concerning symptoms.

## 2025-05-30 NOTE — ED NOTES
Pt ambulated down around the hallway with RN and . Pt states that she doesn't feel more dizzy than her baseline. Able to ambulate with minimal assistance from writer.

## 2025-07-19 ENCOUNTER — HEALTH MAINTENANCE LETTER (OUTPATIENT)
Age: 55
End: 2025-07-19